# Patient Record
Sex: MALE | Race: WHITE | NOT HISPANIC OR LATINO
[De-identification: names, ages, dates, MRNs, and addresses within clinical notes are randomized per-mention and may not be internally consistent; named-entity substitution may affect disease eponyms.]

---

## 2017-09-13 ENCOUNTER — RX ONLY (OUTPATIENT)
Age: 52
Setting detail: RX ONLY
End: 2017-09-13

## 2017-09-13 ENCOUNTER — APPOINTMENT (RX ONLY)
Dept: URBAN - METROPOLITAN AREA CLINIC 371 | Facility: CLINIC | Age: 52
Setting detail: DERMATOLOGY
End: 2017-09-13

## 2017-09-13 DIAGNOSIS — L71.8 OTHER ROSACEA: ICD-10-CM

## 2017-09-13 DIAGNOSIS — I78.8 OTHER DISEASES OF CAPILLARIES: ICD-10-CM

## 2017-09-13 PROCEDURE — 99202 OFFICE O/P NEW SF 15 MIN: CPT

## 2017-09-13 PROCEDURE — ? MEDICAL CONSULTATION: PULSED-DYE LASER

## 2017-09-13 PROCEDURE — ? COUNSELING

## 2017-09-13 PROCEDURE — ? TREATMENT REGIMEN

## 2017-09-13 RX ORDER — IVERMECTIN 10 MG/G
CREAM TOPICAL
Qty: 1 | Refills: 3 | Status: ERX | COMMUNITY
Start: 2017-09-13

## 2017-09-13 RX ORDER — OXYMETAZOLINE HYDROCHLORIDE 10 MG/G
CREAM TOPICAL
Qty: 1 | Refills: 3 | Status: ERX | COMMUNITY
Start: 2017-09-13

## 2017-09-13 ASSESSMENT — LOCATION SIMPLE DESCRIPTION DERM
LOCATION SIMPLE: LEFT CHEEK
LOCATION SIMPLE: RIGHT CHEEK

## 2017-09-13 ASSESSMENT — LOCATION DETAILED DESCRIPTION DERM
LOCATION DETAILED: LEFT MEDIAL MALAR CHEEK
LOCATION DETAILED: RIGHT INFERIOR CENTRAL MALAR CHEEK
LOCATION DETAILED: LEFT CENTRAL MALAR CHEEK
LOCATION DETAILED: RIGHT MEDIAL MALAR CHEEK

## 2017-09-13 ASSESSMENT — LOCATION ZONE DERM: LOCATION ZONE: FACE

## 2017-09-13 NOTE — PROCEDURE: TREATMENT REGIMEN
Plan: recommended atleast 6 sessions of PDL to nose and cheeks every 4-6 weeks
Detail Level: Simple
Initiate Treatment: AM:\\nwash face with gentle cleanser\\npat dry\\napply thin layer of Rhofade to cheeks and forehead\\napply oil free moisturizer with spf\\n\\nPM:\\nwash face with gentle cleanser\\npat dry\\napply thin layer of soolantra to entire face

## 2017-09-22 ENCOUNTER — APPOINTMENT (RX ONLY)
Dept: URBAN - METROPOLITAN AREA CLINIC 368 | Facility: CLINIC | Age: 52
Setting detail: DERMATOLOGY
End: 2017-09-22

## 2017-09-22 DIAGNOSIS — I78.8 OTHER DISEASES OF CAPILLARIES: ICD-10-CM

## 2017-09-22 PROCEDURE — ? COUNSELING

## 2017-09-22 PROCEDURE — ? PULSED-DYE LASER

## 2017-09-22 ASSESSMENT — LOCATION DETAILED DESCRIPTION DERM
LOCATION DETAILED: LEFT INFERIOR CENTRAL MALAR CHEEK
LOCATION DETAILED: LEFT INFERIOR MEDIAL FOREHEAD
LOCATION DETAILED: RIGHT INFERIOR CENTRAL MALAR CHEEK

## 2017-09-22 ASSESSMENT — LOCATION SIMPLE DESCRIPTION DERM
LOCATION SIMPLE: LEFT CHEEK
LOCATION SIMPLE: RIGHT CHEEK
LOCATION SIMPLE: LEFT FOREHEAD

## 2017-09-22 ASSESSMENT — LOCATION ZONE DERM: LOCATION ZONE: FACE

## 2017-09-22 NOTE — PROCEDURE: PULSED-DYE LASER
Cryogen Time (Ms): 30
Spot Size: 7 mm
Pulse Duration: 10 ms
Treated Area: small area
Detail Level: Detailed
Cryogen Time (Ms): 30
Consent: Written consent obtained, risks reviewed including but not limited to crusting, scabbing, blistering, scarring, darker or lighter pigmentary change, incidental hair removal, bruising, and/or incomplete removal.
Delay Time (Ms): 20
Immediate Endpoint: erythema
Laser Type: Vbeam Perfecta 595nm
Location (Required For Details To Render In Note But Body Touch Will Also Count For First Location): cheeks
Post-Procedure Care: Cloderm cream, ice and sunscreen
Post-Care Instructions: I reviewed with the patient in detail post-care instructions. Patient should stay away from the sun and wear sun protection until treated areas are fully healed.